# Patient Record
Sex: FEMALE
[De-identification: names, ages, dates, MRNs, and addresses within clinical notes are randomized per-mention and may not be internally consistent; named-entity substitution may affect disease eponyms.]

---

## 2017-04-20 ENCOUNTER — HOSPITAL ENCOUNTER (OUTPATIENT)
Dept: HOSPITAL 14 - H.ER | Age: 52
Setting detail: OBSERVATION
Discharge: HOME | End: 2017-04-20
Payer: COMMERCIAL

## 2017-04-20 VITALS
HEART RATE: 65 BPM | SYSTOLIC BLOOD PRESSURE: 128 MMHG | TEMPERATURE: 98.4 F | RESPIRATION RATE: 16 BRPM | OXYGEN SATURATION: 100 % | DIASTOLIC BLOOD PRESSURE: 78 MMHG

## 2017-04-20 DIAGNOSIS — M79.606: ICD-10-CM

## 2017-04-20 DIAGNOSIS — R10.31: Primary | ICD-10-CM

## 2017-04-20 LAB
ALBUMIN/GLOB SERPL: 1.2 {RATIO} (ref 1–2.1)
ALP SERPL-CCNC: 82 U/L (ref 38–126)
ALT SERPL-CCNC: 71 U/L (ref 9–52)
APTT BLD: 23.4 SECONDS (ref 23.3–32.5)
AST SERPL-CCNC: 57 U/L (ref 14–36)
BASOPHILS # BLD AUTO: 0.1 K/UL (ref 0–0.2)
BASOPHILS NFR BLD: 0.9 % (ref 0–2)
BILIRUB SERPL-MCNC: 0.3 MG/DL (ref 0.2–1.3)
BUN SERPL-MCNC: 14 MG/DL (ref 7–17)
CALCIUM SERPL-MCNC: 9.5 MG/DL (ref 8.4–10.2)
CHLORIDE SERPL-SCNC: 105 MMOL/L (ref 98–107)
CO2 SERPL-SCNC: 29 MMOL/L (ref 22–30)
EOSINOPHIL # BLD AUTO: 0.1 K/UL (ref 0–0.7)
EOSINOPHIL NFR BLD: 1.5 % (ref 0–4)
ERYTHROCYTE [DISTWIDTH] IN BLOOD BY AUTOMATED COUNT: 14.2 % (ref 11.5–14.5)
GLOBULIN SER-MCNC: 3.4 GM/DL (ref 2.2–3.9)
GLUCOSE SERPL-MCNC: 100 MG/DL (ref 65–105)
HCT VFR BLD CALC: 37.6 % (ref 34–47)
LYMPHOCYTES # BLD AUTO: 1.8 K/UL (ref 1–4.3)
LYMPHOCYTES NFR BLD AUTO: 25 % (ref 20–40)
MCH RBC QN AUTO: 31.1 PG (ref 27–31)
MCHC RBC AUTO-ENTMCNC: 33 G/DL (ref 33–37)
MCV RBC AUTO: 94.3 FL (ref 81–99)
MONOCYTES # BLD: 0.6 K/UL (ref 0–0.8)
MONOCYTES NFR BLD: 8.8 % (ref 0–10)
NEUTROPHILS # BLD: 4.6 K/UL (ref 1.8–7)
NEUTROPHILS NFR BLD AUTO: 63.8 % (ref 50–75)
NRBC BLD AUTO-RTO: 0 % (ref 0–0)
PLATELET # BLD: 225 K/UL (ref 130–400)
PMV BLD AUTO: 9.3 FL (ref 7.2–11.7)
POTASSIUM SERPL-SCNC: 3.4 MMOL/L (ref 3.6–5)
PROT SERPL-MCNC: 7.4 G/DL (ref 6.3–8.2)
SODIUM SERPL-SCNC: 146 MMOL/L (ref 132–148)
WBC # BLD AUTO: 7.3 K/UL (ref 4.8–10.8)

## 2017-04-20 PROCEDURE — 85610 PROTHROMBIN TIME: CPT

## 2017-04-20 PROCEDURE — 80053 COMPREHEN METABOLIC PANEL: CPT

## 2017-04-20 PROCEDURE — 85730 THROMBOPLASTIN TIME PARTIAL: CPT

## 2017-04-20 PROCEDURE — 99282 EMERGENCY DEPT VISIT SF MDM: CPT

## 2017-04-20 PROCEDURE — 93971 EXTREMITY STUDY: CPT

## 2017-04-20 PROCEDURE — 85025 COMPLETE CBC W/AUTO DIFF WBC: CPT

## 2017-04-20 NOTE — ED PDOC
Lower Extremity Pain/Injury


Time Seen by Provider: 17 14:57


Chief Complaint (Nursing): Abdominal Pain


Chief Complaint (Provider): Right Groin Pain


History Per: Patient


History/Exam Limitations: no limitations


Onset/Duration Of Symptoms: Days (x1 month), Intermittent Episodes


Current Symptoms Are (Timing): Still Present


Severity: Moderate


Additional Complaint(s): 


Helen Reis is a 52 year old female, with a past medical history inclusive 

of varicose veins, who presents to the ED on 17 for the evaluation of 

moderate, intermittent right groin pain that she has experienced x1 month. Pain

, further described as radiating down into her left ankle, is reportedly worse 

both with movement and with prolonged standing. Some associated right calf pain 

also reported as well as a sensation of "heaviness" within the affected leg. 

Denies chest pain, shortness of breath, back pain, abdominal discomfort, skin 

discoloration or extremity swelling. Further denies prior history of DVT/

pulmonary embolism, recent surgery, recent travel or use or hormone therapies. 

PERC (-).





PMD: Clay Dietz





Past Medical History


Reviewed: Historical Data, Nursing Documentation, Vital Signs


Vital Signs: 





 Last Vital Signs











Temp  98.4 F   17 14:18


 


Pulse  65   17 14:18


 


Resp  16   17 14:18


 


BP  128/78   17 14:18


 


Pulse Ox  100   17 14:18














- Medical History


PMH: No Chronic Diseases


   Denies: Deep Vein Thrombosis, Pulmonary Embolism


Other PMH: varicose veins





- Surgical History


Surgical History: Hernia Repair


Other surgeries: hysterectomy, varicose vein surgery





- Family History


Family History: States: Unknown Family Hx





- Social History


Current smoker - smoking cessation education provided: No


Alcohol: None


Drugs: Denies





- Allergies


Allergies/Adverse Reactions: 


 Allergies











Allergy/AdvReac Type Severity Reaction Status Date / Time


 


No Known Allergies Allergy   Verified 17 14:18














Review of Systems


Cardiovascular: Negative for: Chest Pain, Edema


Respiratory: Negative for: Shortness of Breath, SOB with Exertion


Gastrointestinal: Negative for: Abdominal Pain


Musculoskeletal: Positive for: Leg Pain (right calf pain w/sensation of 

"heaviness" within affected leg; no skin discoloration), Other (right groin 

pain w/radiation down towards ankle).  Negative for: Back Pain





Physical Exam





- Reviewed


Nursing Documentation Reviewed: Yes


Vital Signs Reviewed: Yes





- Physical Exam


Appears: Positive for: Non-toxic, No Acute Distress


Head Exam: Positive for: ATRAUMATIC, NORMOCEPHALIC


Skin: Positive for: Normal Color, Warm, Dry


Neck: Positive for: Normal, Painless ROM, Supple


Cardiovascular/Chest: Positive for: Regular Rate, Rhythm.  Negative for: Murmur


Respiratory: Positive for: Normal Breath Sounds.  Negative for: Respiratory 

Distress


Pulses-Dorsalis Pedis (R): 2+


Gastrointestinal/Abdominal: Positive for: Normal Exam, Soft.  Negative for: 

Tenderness


Back: Positive for: Normal Inspection


Extremity: Positive for: Normal ROM, Calf Tenderness (right), Capillary Refill (

<2 seconds), Other (right groin tenderness w/faint right femoral pulse).  

Negative for: Deformity, Swelling


Neurologic/Psych: Positive for: Alert, Oriented





- Laboratory Results


Result Diagrams: 


 17 15:20





 17 15:20


Interpretation Of Abn Labs: K+ low-given K-dur





- ECG


O2 Sat by Pulse Oximetry: 100 (RA)


Pulse Ox Interpretation: Normal





Medical Decision Making


Medical Decision Makin:57


Initial Impression: right groin/calf pain; will r/o DVT





Initial Plan:


* Duplex LE Vein, Right


* Labs


* PTT


* PT


* Reevaluation





17:04. Pt with prolonged ED wait-due to signidicant wait in US. PT made aware 

and supervisor made of delay.


pt with negative DVT


pt most liekly with MSK strain advised to f.u with pmd for PTx if needed and 

motrin





--------------------------------------------------------------------------------

----------------- 


Scribe Attestation:


Documented by Dulce Canela, acting as a scribe for Demetrice Moncada PA-C.





Provider Scribe Attestation:


All medical record entries made by the Scribe were at my direction and 

personally dictated by me. I have reviewed the chart and agree that the record 

accurately reflects my personal performance of the history, physical exam, 

medical decision making, and the department course for this patient. I have 

also personally directed, reviewed, and agree with the discharge instructions 

and disposition.





ED OBSERVATION


Discharge: Yes


Date of observation admission: 17


Time of observation admission: 17:08





- Observation admission statement


Patient is being placed in observation because:: 


results of exam





- Goals of Observation


Goals of observation are:: 


results of US





Disposition





- Clinical Impression


Clinical Impression: 


 Cramps of lower extremity





- Patient ED Disposition


Is Patient to be Admitted: No


Counseled Patient/Family Regarding: Studies Performed, Diagnosis, Need For 

Followup, Rx Given





- Disposition


Disposition: Routine/Home


Disposition Time: 19:07


Condition: GOOD

## 2017-04-20 NOTE — US
EXAM:

  US Duplex Right Lower Extremity Veins



CLINICAL HISTORY:

  52 years old, female; Pain; Leg, lower; Right; Additional info: Groin pain 

with calf pain



TECHNIQUE:

  Real-time ultrasound scan of the veins of the right lower extremity with 

color Doppler flow, spectral waveform analysis and compression.



EXAM DATE/TIME:

  4/20/2017 3:08 PM



COMPARISON:

  No relevant prior studies available.



FINDINGS:

  Normal-appearing compressibility and flow are seen in the right common 

femoral, femoral and popliteal veins.  There is also normal augmentation 

response in the right common femoral, femoral and popliteal veins.



IMPRESSION:   

  No evidence of deep venous thrombosis in the right leg.

## 2017-11-23 ENCOUNTER — HOSPITAL ENCOUNTER (EMERGENCY)
Dept: HOSPITAL 31 - C.ER | Age: 52
Discharge: HOME | End: 2017-11-23
Payer: COMMERCIAL

## 2017-11-23 VITALS — DIASTOLIC BLOOD PRESSURE: 78 MMHG | HEART RATE: 98 BPM | RESPIRATION RATE: 16 BRPM | SYSTOLIC BLOOD PRESSURE: 148 MMHG

## 2017-11-23 VITALS — TEMPERATURE: 98.1 F

## 2017-11-23 VITALS — OXYGEN SATURATION: 100 %

## 2017-11-23 DIAGNOSIS — R30.0: ICD-10-CM

## 2017-11-23 DIAGNOSIS — J40: Primary | ICD-10-CM

## 2017-11-23 DIAGNOSIS — I10: ICD-10-CM

## 2017-11-23 LAB
BACTERIA #/AREA URNS HPF: (no result) /[HPF]
BILIRUB UR-MCNC: NEGATIVE MG/DL
GLUCOSE UR STRIP-MCNC: NORMAL MG/DL
KETONES UR STRIP-MCNC: NEGATIVE MG/DL
LEUKOCYTE ESTERASE UR-ACNC: (no result) LEU/UL
PH UR STRIP: 6 [PH] (ref 5–8)
PROT UR STRIP-MCNC: NEGATIVE MG/DL
RBC # UR STRIP: NEGATIVE /UL
RBC #/AREA URNS HPF: 1 /HPF (ref 0–3)
SP GR UR STRIP: 1.01 (ref 1–1.03)
UROBILINOGEN UR-MCNC: NORMAL MG/DL (ref 0.2–1)
WBC #/AREA URNS HPF: 1 /HPF (ref 0–5)

## 2017-11-23 NOTE — RAD
HISTORY:

cough x 3 weeks  



COMPARISON:

No prior.



TECHNIQUE:

Chest PA and lateral



FINDINGS:



LUNGS:

No active pulmonary disease.



PLEURA:

No significant pleural effusion identified. No pneumothorax apparent.



CARDIOVASCULAR:

Normal.



OSSEOUS STRUCTURES:

No significant abnormalities.



VISUALIZED UPPER ABDOMEN:

Normal.



OTHER FINDINGS:

None.



IMPRESSION:

No active disease.

## 2017-11-23 NOTE — C.PDOC
History Of Present Illness


52 year old female presents to the ED c/o chest congestion and urinary 

symptoms. Patient states the chest congestion has been happening for 

approximately 3 weeks associated with a fever that has resolved. She also 

states feeling a burning sensation while urinating that started yesterday. 

Patient denies abdominal pain, back pain, nausea, vomit.


Time Seen by Provider: 11/23/17 15:42


Chief Complaint (Nursing): Cough, Cold, Congestion


History Per: Patient


History/Exam Limitations: no limitations


Onset/Duration Of Symptoms: Days


Current Symptoms Are (Timing): Still Present


Sick Contacts (Context): None


Associated Symptoms: denies: Fever, Cough, Sinus Drainage, Nausea, Diarrhea


Recent travel outside of the [place]: No


Additional History Per: Patient





Past Medical History


Reviewed: Historical Data, Nursing Documentation, Vital Signs


Vital Signs: 


 Last Vital Signs











Temp  98.1 F   11/23/17 15:15


 


Pulse  98 H  11/23/17 16:56


 


Resp  16   11/23/17 16:56


 


BP  148/78   11/23/17 16:56


 


Pulse Ox  96   11/23/17 16:56














- Medical History


PMH: HTN


   Denies: Deep Vein Thrombosis, Pulmonary Embolism


Surgical History: Hernia Repair


Family History: States: Unknown Family Hx





- Social History


Hx Alcohol Use: No


Hx Substance Use: No





Review Of Systems


Constitutional: Negative for: Fever, Chills


ENT: Positive for: Nose Congestion.  Negative for: Nose Discharge, Throat Pain


Cardiovascular: Negative for: Chest Pain


Respiratory: Negative for: Cough, Shortness of Breath


Gastrointestinal: Negative for: Nausea, Vomiting, Abdominal Pain


Musculoskeletal: Negative for: Back Pain


Neurological: Negative for: Weakness, Numbness





Physical Exam





- Physical Exam


Appears: Non-toxic, No Acute Distress


Skin: Normal Color, Warm, Dry


Head: Atraumatic, Normacephalic


Nose: No Discharge


Oral Mucosa: Moist


Throat: Normal, No Erythema, No Exudate


Neck: Normal ROM, Supple


Chest: Symmetrical


Cardiovascular: Rhythm Regular, No Murmur


Respiratory: Rhonchi (Scattered)


Gastrointestinal/Abdominal: Soft, No Tenderness


Back: No CVA Tenderness


Extremity: Normal ROM


Neurological/Psych: Oriented x3, Normal Speech, Normal Cognition





ED Course And Treatment


O2 Sat by Pulse Oximetry: 100 (On RA)


Pulse Ox Interpretation: Normal





- Radiology


CXR: Interpreted by Me


CXR Interpretation: Yes: No Acute Disease.  No: Infiltrates


Progress Note: On re-exam, the patient reports improvement of symptoms. Lungs 

are CTA, heart is RRR. ambulatory in the ED with steady gait. Abdomen is soft, 

non-tender and patient is tolerating PO well. Follow up with the medical doctor 

within 1-2 days, return if worsened.





Medical Decision Making


Medical Decision Making: 


Plan:


* CXR ordered


* UA ordered


* Urine culture collected











Disposition





- Disposition


Referrals: 


Osvaldo Arteaga MD [Staff Provider] - 


Disposition: HOME/ ROUTINE


Disposition Time: 16:40


Condition: GOOD


Additional Instructions: 


Follow up with the medical doctor within 1-2 days. return if worsened. 


Prescriptions: 


Benzonatate [Tessalon Perles] 100 mg PO TID PRN #21 sgl


 PRN Reason: Cough


Loratadine [Claritin] 10 mg PO DAILY #10 tab


Nitrofurantoin Macrocrystals [Macrobid] 1 cap PO BID #14 cap


predniSONE [Prednisone] 20 mg PO BID #10 tab


Instructions:  Acute Bronchitis (ED)


Forms:  [facility] (English)


Print Language: Portuguese





- Clinical Impression


Clinical Impression: 


 Bronchitis, Dysuria








- PA / NP / Resident Statement


MD/DO has reviewed & agrees with the documentation as recorded.





- Scribe Statement


The provider has reviewed the documentation as recorded by the Scribe





Vu Avilez





All medical record entries made by the Teresaibmani were at my direction and 

personally dictated by me. I have reviewed the chart and agree that the record 

accurately reflects my personal performance of the history, physical exam, 

medical decision making, and the department course for this patient. I have 

also personally directed, reviewed, and agree with the discharge instructions 

and disposition.

## 2018-09-23 ENCOUNTER — HOSPITAL ENCOUNTER (EMERGENCY)
Dept: HOSPITAL 31 - C.ER | Age: 53
Discharge: HOME | End: 2018-09-23
Payer: COMMERCIAL

## 2018-09-23 VITALS — RESPIRATION RATE: 16 BRPM | SYSTOLIC BLOOD PRESSURE: 123 MMHG | HEART RATE: 83 BPM | DIASTOLIC BLOOD PRESSURE: 82 MMHG

## 2018-09-23 VITALS — TEMPERATURE: 98.1 F

## 2018-09-23 DIAGNOSIS — R10.31: Primary | ICD-10-CM

## 2018-09-23 LAB
ALBUMIN SERPL-MCNC: 4.2 [, G/DL] (ref 3.5–5)
ALBUMIN/GLOB SERPL: 1.3 [,] (ref 1–2.1)
ALT SERPL-CCNC: 28 [, U/L] (ref 9–52)
AST SERPL-CCNC: 32 [, U/L] (ref 14–36)
BASE EXCESS BLDV CALC-SCNC: 5.2 [, MMOL/L] (ref 0–2)
BASOPHILS # BLD AUTO: 0.1 [, K/UL] (ref 0–0.2)
BASOPHILS NFR BLD: 1.1 [, %] (ref 0–2)
BILIRUB UR-MCNC: NEGATIVE [,]
BUN SERPL-MCNC: 18 [, MG/DL] (ref 7–17)
CALCIUM SERPL-MCNC: 9.5 [, MG/DL] (ref 8.6–10.4)
EOSINOPHIL # BLD AUTO: 0.2 [, K/UL] (ref 0–0.7)
EOSINOPHIL NFR BLD: 2.6 [, %] (ref 0–4)
ERYTHROCYTE [DISTWIDTH] IN BLOOD BY AUTOMATED COUNT: 13.1 [, %] (ref 11.5–14.5)
GFR NON-AFRICAN AMERICAN: > 60 [,]
GLUCOSE UR STRIP-MCNC: NORMAL [, MG/DL]
HCG,QUALITATIVE URINE: NEGATIVE [,]
HGB BLD-MCNC: 12.6 [, G/DL] (ref 11–16)
LEUKOCYTE ESTERASE UR-ACNC: (no result) [, LEU/UL]
LIPASE: 90 [, U/L] (ref 23–300)
LYMPHOCYTES # BLD AUTO: 2.5 [, K/UL] (ref 1–4.3)
LYMPHOCYTES NFR BLD AUTO: 43 [, %] (ref 20–40)
MCH RBC QN AUTO: 30.9 [, PG] (ref 27–31)
MCHC RBC AUTO-ENTMCNC: 33.2 [, G/DL] (ref 33–37)
MCV RBC AUTO: 92.9 [, FL] (ref 81–99)
MONOCYTES # BLD: 0.5 [, K/UL] (ref 0–0.8)
MONOCYTES NFR BLD: 8.7 [, %] (ref 0–10)
NEUTROPHILS # BLD: 2.6 [, K/UL] (ref 1.8–7)
NEUTROPHILS NFR BLD AUTO: 44.6 [, %] (ref 50–75)
NRBC BLD AUTO-RTO: 0 [, %] (ref 0–2)
PCO2 BLDV: 59 [, MMHG] (ref 40–60)
PH BLDV: 7.35 [,] (ref 7.32–7.43)
PH UR STRIP: 6 [,] (ref 5–8)
PLATELET # BLD: 219 [, K/UL] (ref 130–400)
PMV BLD AUTO: 10.1 [, FL] (ref 7.2–11.7)
PROT UR STRIP-MCNC: NEGATIVE [, MG/DL]
RBC # BLD AUTO: 4.07 [, MIL/UL] (ref 3.8–5.2)
RBC # UR STRIP: NEGATIVE [,]
SP GR UR STRIP: 1.01 [,] (ref 1–1.03)
SQUAMOUS EPITHIAL: < 1 [, /HPF] (ref 0–5)
UROBILINOGEN UR-MCNC: NORMAL [, MG/DL] (ref 0.2–1)
VENOUS BLOOD GAS PO2: 21 [, MM/HG] (ref 30–55)
WBC # BLD AUTO: 5.8 [, K/UL] (ref 4.8–10.8)

## 2018-09-23 NOTE — C.PDOC
History Of Present Illness


52 y/o female presents to ED for evaluation of intermittent RLQ abdominal pain 

for the past year but worse for the past month. Notes pain is worse when 

walking and occasionally radiates down to her right leg. Pt reports having Abd 

& Pelvis CT scan, MRI, and Xrays done with normal findings as per patient. 

Findings demonstrate accumulation of stool, and gas. Pt stats she has been 

having normal bowel movements. Notes she had colonoscopy done a year ago which 

was also normal. Denies trying any pain meds at home. Denies constipation, n/v/d

, blood in stool, urinary symptoms, back pain, or fever. 





Time Seen by Provider: 18 16:44


Chief Complaint (Nursing): Abdominal Pain


History Per: Patient


History/Exam Limitations: no limitations


Onset/Duration Of Symptoms: Days


Current Symptoms Are (Timing): Still Present


Location Of Pain/Discomfort: RLQ


Radiation Of Pain To:: Leg


Quality Of Discomfort: "Pain"


Associated Symptoms: denies: Nausea, Vomiting, Diarrhea, Loss Of Appetite, Back 

Pain, Chest Pain, Urinary Symptoms


Exacerbating Factors: Walking


Alleviating Factors: None


Last Bowel Movement: Today


Recent travel outside of the [place]: No


Additional History Per: Patient


Abnormal Vaginal Bleeding: No





Past Medical History


Reviewed: Historical Data, Nursing Documentation, Vital Signs


Vital Signs: 


 Last Vital Signs











Temp  98.1 F   18 16:29


 


Pulse  83   18 20:08


 


Resp  16   18 20:08


 


BP  123/82   18 20:08


 


Pulse Ox  98   18 20:08














- Medical History


PMH: HTN


   Denies: Deep Vein Thrombosis, Pulmonary Embolism


Surgical History: Hernia Repair


Family History: States: Unknown Family Hx





- Social History


Hx Alcohol Use: No


Hx Substance Use: No





- Immunization History


Hx Tetanus Toxoid Vaccination: No


Hx Influenza Vaccination: No


Hx Pneumococcal Vaccination: No





Review Of Systems


Except As Marked, All Systems Reviewed And Found Negative.


Constitutional: Negative for: Fever, Chills


Cardiovascular: Negative for: Chest Pain


Respiratory: Negative for: Shortness of Breath


Gastrointestinal: Positive for: Abdominal Pain.  Negative for: Nausea, Vomiting

, Diarrhea, Constipation


Genitourinary: Negative for: Dysuria, Frequency, Hematuria


Musculoskeletal: Negative for: Back Pain





Physical Exam





- Physical Exam


Appears: Non-toxic, No Acute Distress


Skin: Normal Color, Warm, Dry


Head: Atraumatic, Normacephalic


Eye(s): bilateral: Normal Inspection


Oral Mucosa: Moist


Neck: Normal ROM, Supple


Chest: Symmetrical


Cardiovascular: Rhythm Regular, No Murmur


Respiratory: Normal Breath Sounds, No Rales, No Rhonchi, No Wheezing


Gastrointestinal/Abdominal: Bowel Sounds, Soft, No Tenderness, No Distention, 

No Guarding, No Rebound


Extremity: Normal ROM


Neurological/Psych: Oriented x3, Normal Speech





ED Course And Treatment





- Laboratory Results


Result Diagrams: 


 18 18:13





 18 18:13


O2 Sat by Pulse Oximetry: 99 (RA)


Pulse Ox Interpretation: Normal





Medical Decision Making


Medical Decision Makin yr old female p/w chronic abdominal pain w/ largely benign abdominal exam. 

No RLQ pain on my exam. No alarming symptoms and or signs noted. Will seek labs 

and reassessment. 





Plan:


Blood work


Urinalysis


Tylenol


Maalox


Mylicon








labs unremarkable


reamins w/ out RLQ pain on my re-exam


no peritoneal signs


vitals stable


clear for d/c home








Disposition





- Disposition


Referrals: 


[facility] [Outside]


Janet Terry MD [Medical Doctor] - 


Disposition: HOME/ ROUTINE


Disposition Time: 19:55


Condition: GOOD


Instructions:  Acute Abdomen (Belly Pain), Adult (DC)


Forms:  [facility] (English)





- Clinical Impression


Clinical Impression: 


 Abdominal pain








- Scribe Statement


The provider has reviewed the documentation as recorded by the Scribe





KP





All medical record entries made by the Scribe were at my direction and 

personally dictated by me. I have reviewed the chart and agree that the record 

accurately reflects my personal performance of the history, physical exam, 

medical decision making, and the department course for this patient. I have 

also personally directed, reviewed, and agree with the discharge instructions 

and disposition.

## 2018-09-24 VITALS — OXYGEN SATURATION: 99 %
